# Patient Record
Sex: MALE | Race: WHITE | NOT HISPANIC OR LATINO | Employment: OTHER | URBAN - METROPOLITAN AREA
[De-identification: names, ages, dates, MRNs, and addresses within clinical notes are randomized per-mention and may not be internally consistent; named-entity substitution may affect disease eponyms.]

---

## 2019-08-30 ENCOUNTER — COMMUNICATION - HEALTHEAST (OUTPATIENT)
Dept: SCHEDULING | Facility: CLINIC | Age: 59
End: 2019-08-30

## 2019-08-30 ENCOUNTER — OFFICE VISIT - HEALTHEAST (OUTPATIENT)
Dept: FAMILY MEDICINE | Facility: CLINIC | Age: 59
End: 2019-08-30

## 2019-08-30 DIAGNOSIS — Z87.19 HISTORY OF DIVERTICULITIS: ICD-10-CM

## 2019-08-30 DIAGNOSIS — R10.32 ABDOMINAL PAIN, LEFT LOWER QUADRANT: ICD-10-CM

## 2019-08-30 RX ORDER — ATORVASTATIN CALCIUM 40 MG/1
TABLET, FILM COATED ORAL
Status: SHIPPED | COMMUNITY
Start: 2019-03-28

## 2019-08-30 RX ORDER — LOSARTAN POTASSIUM 25 MG/1
25 TABLET ORAL
Status: SHIPPED | COMMUNITY
Start: 2019-08-30

## 2019-09-04 ENCOUNTER — RECORDS - HEALTHEAST (OUTPATIENT)
Dept: ADMINISTRATIVE | Facility: OTHER | Age: 59
End: 2019-09-04

## 2019-09-20 ENCOUNTER — HOSPITAL ENCOUNTER (OUTPATIENT)
Dept: CT IMAGING | Facility: CLINIC | Age: 59
Discharge: HOME | End: 2019-09-20

## 2019-09-20 DIAGNOSIS — R10.32 LLQ PAIN: ICD-10-CM

## 2019-09-20 LAB
CREAT BLD-MCNC: 0.9 MG/DL (ref 0.7–1.3)
GFR SERPL CREATININE-BSD FRML MDRD: >60 ML/MIN/1.73M2

## 2021-05-31 ENCOUNTER — RECORDS - HEALTHEAST (OUTPATIENT)
Dept: ADMINISTRATIVE | Facility: CLINIC | Age: 61
End: 2021-05-31

## 2021-05-31 NOTE — TELEPHONE ENCOUNTER
"Pt reports he gets diverticulitis about every three months. Pt reports \"I think I am getting it again\". Tender LLQ currently. Urge to have a bowel movement. No fever. Passing gas.Pt rates pain \"4\" and constant. Pt denies any other acute symptoms.[facility] for three days due to holiday weekend per pt.     Advised pt to be seen at [facility] clinic within four hours per protocol.    Pt states he plans to go to [facility] tomorrow. Verbalizes understanding of recommendation to be seen today.       Reason for Disposition    [1] MILD-MODERATE pain AND [2] constant AND [3] present > 2 hours    Protocols used: ABDOMINAL PAIN - MALE-A-AH      "

## 2021-06-03 VITALS — WEIGHT: 186 LBS

## 2021-06-17 NOTE — PATIENT INSTRUCTIONS - HE
Patient Instructions by Denton Jon PA-C at 8/30/2019  5:30 PM     Author: Denton Jon PA-C Service: -- Author Type: Physician Assistant    Filed: 8/30/2019  6:04 PM Encounter Date: 8/30/2019 Status: Addendum    : Denton Jon PA-C (Physician Assistant)    Related Notes: Original Note by Denton Jon PA-C (Physician Assistant) filed at 8/30/2019  6:03 PM         Take antibiotic as written for your diverticulitis.  If you develop worsening pain, blood or mucus in the stools or fever or fatigue follow-up in the emergency room immediately for reevaluation and potential abdominal CT.  He should not be getting any worse over the next 2 days.  He should have good improvement after 48 hours.  Follow the instructions for discharge as outlined below.      Patient Education     Discharge Instructions for Diverticulitis  You have been diagnosed with diverticulitis. This is a condition in which small pouches form in your colon (large intestine) and become inflamed or infected. Follow the guidelines below for home care.  As you recover  Tips for recovery include:    Eat a low-fiber diet. Your healthcare provider may advise a liquid diet. This gives your bowel a chance to rest so that it can recover.    Foods to include: flake cereal, mashed potatoes, pancakes, waffles, pasta, white bread, rice, applesauce, bananas, eggs, fish, poultry, tofu, and well-cooked vegetables    Take your medicines as directed. Do not stop taking the medicines, even if you feel better.    Monitor your temperature and report any rising temperature to your healthcare provider.    Take antibiotics exactly as directed. Do not miss any and keep taking them even if you feel better.     Drink 6 to 8 glasses of water every day, unless directed otherwise.    Use a heating pad or hot water bottle to reduce abdominal cramping or pain.  Preventing diverticulitis in the future  Tips for prevention include:    Eat a high-fiber diet. Fiber adds bulk to the  stool so that it passes through the large intestine more easily.    Keep drinking 6 to 8 glasses of water every day, unless directed otherwise.    Begin an exercise program. Ask your healthcare provider how to get started. You can benefit from simple activities such as walking or gardening.    Treat diarrhea with a bland diet. Start with liquids only, then slowly add fiber over time.    Watch for changes in your bowel movements (constipation to diarrhea).    Avoid constipation with fiber and add a stool softener if needed.     Get plenty of rest and sleep.  Follow-up care  Make a follow-up appointment as directed by our staff.  When to call your healthcare provider  Call your healthcare provider immediately if you have any of the following:    Fever of 100.4 F (38.0 C) or higher, or as directed by your healthcare provider    Chills    Severe cramps in the belly, most commonly the lower left side    Tenderness in the belly, most commonly the lower left side    Nausea and vomiting    Bleeding from your rectum   Date Last Reviewed: 7/1/2016 2000-2017 [employer]. [address] [place] [address]. All rights reserved. This information is not intended as a substitute for professional medical care. Always follow your healthcare professional's instructions.           Patient Education     Diverticulitis    Some people get pouches along the wall of the colon as they get older. The pouches, called diverticuli, usually cause no symptoms. If the pouches become blocked, you can get an infection. This infection is called diverticulitis. It causes pain in your lower abdomen and fever. If not treated, it can become a serious condition, causing an abscess to form inside the pouch. The abscess may block the intestinal tract even or rupture, spreading infection throughout the abdomen.  When treatment is started early, oral antibiotics alone may be enough to cure diverticulitis. This method is tried first.  But, if you don't improve or if your condition gets worse while using oral antibiotics, you may need to be admitted to the hospital for IV antibiotics. Severe cases may require surgery.  Home care  The following guidelines will help you care for yourself at home:    During the acute illness, rest and follow your healthcare provider's instructions about diet. Sometimes you will need to follow a clear liquid diet to rest your bowel. Once your symptoms are better, you may be told to follow a low-fiber diet for some time. Include foods like:  ? Flake cereal, mashed potatoes, pancakes, waffles, pasta, white bread, rice, applesauce, bananas, eggs, fish, poultry, tofu, and cooked soft vegetables    Take antibiotics exactly as instructed. Don't miss any doses or stop taking the medication, even if you feel better.    Monitor your temperature and tell your healthcare provider if you have rising temperatures.  Preventing future attacks  Once you have an episode of diverticulitis, you are at risk for having it again. After you have recovered from this episode, you may be able to lower your risk by eating a high-fiber diet (20 gm/day to 35 gm/day of fiber). This cleans out the colon pouches that already exist and may prevent new ones from forming. Foods high in fiber include fresh fruits and edible peelings, raw or lightly cooked vegetables, whole grain cereals and breads, dried beans and peas, and bran.  Other steps that can help prevent future attacks include:    Take your medicines, such as antibiotics, as your healthcare provider says.    Drink 6 to 8 glasses of water every day, unless told otherwise.    Use a heating pad or hot water bottle to help abdominal cramping or pain.    Begin an exercise program. Ask your healthcare provider how to get started. You can benefit from simple activities such as walking or gardening.    Treat diarrhea with a bland diet. Start with liquids only; then slowly add fiber over time.    Watch  for changes in your bowel movements (constipation to diarrhea). Avoid constipation by eating a high fiber diet and taking a stool softener if needed.    Get plenty of rest and sleep.  Follow-up care  Follow up with your healthcare provider as advised or sooner if you are not getting better in the next 2 days.  When to seek medical advice  Call your healthcare provider right away if any of these occur:    Fever of 100.4 F (38 C) or higher, or as directed by your healthcare provider    Repeated vomiting or swelling of the abdomen    Weakness, dizziness, light-headedness    Pain in your abdomen that gets worse, severe, or spreads to your back    Pain that moves to the right lower abdomen    Rectal bleeding (stools that are red, black or maroon color)    Unexpected vaginal bleeding  Date Last Reviewed: 9/1/2016 2000-2017 [employer]. [address] [place] [address], [place] [address]. All rights reserved. This information is not intended as a substitute for professional medical care. Always follow your healthcare professional's instructions.

## 2021-06-27 NOTE — PROGRESS NOTES
Progress Notes by Denton Jon PA-C at 8/30/2019  5:30 PM     Author: Denton Jon PA-C Service: -- Author Type: Physician Assistant    Filed: 8/30/2019  6:14 PM Encounter Date: 8/30/2019 Status: Signed    : Denton Jon PA-C (Physician Assistant)       Subjective:      Patient ID: Ishan Scanlon is a 58 y.o. male.    Chief Complaint:    HPI     Ishan Scanlon is a 58 y.o. male with a past medical history of diverticulitis who presents today complaining of 4-day history of left lower quadrant abdominal pain.  Patient states that it started out with some constipation on the left lower quadrant he has had a small amount of discomfort.  He had used bulk forming agent and he has had a stool today.  There is no blood or mucus in the stool.  Subsequently had left lower quadrant pain that developed roughly a 4 out of 10 pain.  He does not have any difficulty with nausea and vomiting and no fever chills or night sweats.  He has increased his fluids and has rested.  He has not had any other complications to report.    No past medical history on file.    No past surgical history on file.    No family history on file.    Social History     Tobacco Use   ? Smoking status: Never Smoker   ? Smokeless tobacco: Never Used   Substance Use Topics   ? Alcohol use: Not on file   ? Drug use: Not on file       Review of Systems  As above in HPI, otherwise balance of Review of Systems are negative.    Objective:     /73 (Patient Site: Right Arm, Patient Position: Sitting, Cuff Size: Adult Regular)   Pulse 66   Temp 98.1  F (36.7  C) (Oral)   Resp 16   Wt 186 lb (84.4 kg)   SpO2 96%     Physical Exam  General: Patient is resting comfortably no acute distress is afebrile  HEENT: Head is normocephalic atraumatic   eyes are PERRL EOMI sclera anicteric   TMs are clear bilaterally  Throat is clear  No cervical lymphadenopathy present  LUNGS: Clear to auscultation bilaterally  HEART: Regular rate and rhythm  Abdomen:  Normoactive bowel sounds x4.  He does have left lower quadrant abdominal pain to palpation.  Slight rebound but no guarding.  Skin: Without rash non-diaphoretic      Assessment:     Procedures    The primary encounter diagnosis was Abdominal pain, left lower quadrant. A diagnosis of History of diverticulitis was also pertinent to this visit.    Plan:     1. Abdominal pain, left lower quadrant  ciprofloxacin HCl (CIPRO) 500 MG tablet    metroNIDAZOLE (FLAGYL) 500 MG tablet   2. History of diverticulitis  ciprofloxacin HCl (CIPRO) 500 MG tablet    metroNIDAZOLE (FLAGYL) 500 MG tablet       Multiple etiologies and diagnoses were considered.  I did state and have a long conversation with the patient stating that he has had diverticulitis in the past.  He is not having a fever and he has minimal pain.  Empiric course of antibiotics as written to include the Cipro and Flagyl.  Indication for return to include fever, blood or mucus in the stools or increasing abdominal pain he will present to the emergency room for CT of the abdomen and reevaluation of treatment.  Patient voiced understanding of that concern.    Patient Instructions       Take antibiotic as written for your diverticulitis.  If you develop worsening pain, blood or mucus in the stools or fever or fatigue follow-up in the emergency room immediately for reevaluation and potential abdominal CT.  He should not be getting any worse over the next 2 days.  He should have good improvement after 48 hours.  Follow the instructions for discharge as outlined below.      Patient Education     Discharge Instructions for Diverticulitis  You have been diagnosed with diverticulitis. This is a condition in which small pouches form in your colon (large intestine) and become inflamed or infected. Follow the guidelines below for home care.  As you recover  Tips for recovery include:    Eat a low-fiber diet. Your healthcare provider may advise a liquid diet. This gives your bowel  a chance to rest so that it can recover.    Foods to include: flake cereal, mashed potatoes, pancakes, waffles, pasta, white bread, rice, applesauce, bananas, eggs, fish, poultry, tofu, and well-cooked vegetables    Take your medicines as directed. Do not stop taking the medicines, even if you feel better.    Monitor your temperature and report any rising temperature to your healthcare provider.    Take antibiotics exactly as directed. Do not miss any and keep taking them even if you feel better.     Drink 6 to 8 glasses of water every day, unless directed otherwise.    Use a heating pad or hot water bottle to reduce abdominal cramping or pain.  Preventing diverticulitis in the future  Tips for prevention include:    Eat a high-fiber diet. Fiber adds bulk to the stool so that it passes through the large intestine more easily.    Keep drinking 6 to 8 glasses of water every day, unless directed otherwise.    Begin an exercise program. Ask your healthcare provider how to get started. You can benefit from simple activities such as walking or gardening.    Treat diarrhea with a bland diet. Start with liquids only, then slowly add fiber over time.    Watch for changes in your bowel movements (constipation to diarrhea).    Avoid constipation with fiber and add a stool softener if needed.     Get plenty of rest and sleep.  Follow-up care  Make a follow-up appointment as directed by our staff.  When to call your healthcare provider  Call your healthcare provider immediately if you have any of the following:    Fever of 100.4 F (38.0 C) or higher, or as directed by your healthcare provider    Chills    Severe cramps in the belly, most commonly the lower left side    Tenderness in the belly, most commonly the lower left side    Nausea and vomiting    Bleeding from your rectum   Date Last Reviewed: 7/1/2016 2000-2017 [employer]. [address] [place] [address], [place] [address]. All rights reserved. This information  is not intended as a substitute for professional medical care. Always follow your healthcare professional's instructions.           Patient Education     Diverticulitis    Some people get pouches along the wall of the colon as they get older. The pouches, called diverticuli, usually cause no symptoms. If the pouches become blocked, you can get an infection. This infection is called diverticulitis. It causes pain in your lower abdomen and fever. If not treated, it can become a serious condition, causing an abscess to form inside the pouch. The abscess may block the intestinal tract even or rupture, spreading infection throughout the abdomen.  When treatment is started early, oral antibiotics alone may be enough to cure diverticulitis. This method is tried first. But, if you don't improve or if your condition gets worse while using oral antibiotics, you may need to be admitted to the hospital for IV antibiotics. Severe cases may require surgery.  Home care  The following guidelines will help you care for yourself at home:    During the acute illness, rest and follow your healthcare provider's instructions about diet. Sometimes you will need to follow a clear liquid diet to rest your bowel. Once your symptoms are better, you may be told to follow a low-fiber diet for some time. Include foods like:  ? Flake cereal, mashed potatoes, pancakes, waffles, pasta, white bread, rice, applesauce, bananas, eggs, fish, poultry, tofu, and cooked soft vegetables    Take antibiotics exactly as instructed. Don't miss any doses or stop taking the medication, even if you feel better.    Monitor your temperature and tell your healthcare provider if you have rising temperatures.  Preventing future attacks  Once you have an episode of diverticulitis, you are at risk for having it again. After you have recovered from this episode, you may be able to lower your risk by eating a high-fiber diet (20 gm/day to 35 gm/day of fiber). This cleans  out the colon pouches that already exist and may prevent new ones from forming. Foods high in fiber include fresh fruits and edible peelings, raw or lightly cooked vegetables, whole grain cereals and breads, dried beans and peas, and bran.  Other steps that can help prevent future attacks include:    Take your medicines, such as antibiotics, as your healthcare provider says.    Drink 6 to 8 glasses of water every day, unless told otherwise.    Use a heating pad or hot water bottle to help abdominal cramping or pain.    Begin an exercise program. Ask your healthcare provider how to get started. You can benefit from simple activities such as walking or gardening.    Treat diarrhea with a bland diet. Start with liquids only; then slowly add fiber over time.    Watch for changes in your bowel movements (constipation to diarrhea). Avoid constipation by eating a high fiber diet and taking a stool softener if needed.    Get plenty of rest and sleep.  Follow-up care  Follow up with your healthcare provider as advised or sooner if you are not getting better in the next 2 days.  When to seek medical advice  Call your healthcare provider right away if any of these occur:    Fever of 100.4 F (38 C) or higher, or as directed by your healthcare provider    Repeated vomiting or swelling of the abdomen    Weakness, dizziness, light-headedness    Pain in your abdomen that gets worse, severe, or spreads to your back    Pain that moves to the right lower abdomen    Rectal bleeding (stools that are red, black or maroon color)    Unexpected vaginal bleeding  Date Last Reviewed: 9/1/2016 2000-2017 [employer]. [address] [place] [address], [place] [address]. All rights reserved. This information is not intended as a substitute for professional medical care. Always follow your healthcare professional's instructions.

## 2022-08-15 ENCOUNTER — HOSPITAL ENCOUNTER (EMERGENCY)
Facility: CLINIC | Age: 62
Discharge: HOME | End: 2022-08-15
Attending: FAMILY MEDICINE | Admitting: FAMILY MEDICINE
Payer: OTHER GOVERNMENT

## 2022-08-15 VITALS
WEIGHT: 182 LBS | HEART RATE: 80 BPM | TEMPERATURE: 97.1 F | SYSTOLIC BLOOD PRESSURE: 128 MMHG | OXYGEN SATURATION: 97 % | BODY MASS INDEX: 25.48 KG/M2 | RESPIRATION RATE: 18 BRPM | HEIGHT: 71 IN | DIASTOLIC BLOOD PRESSURE: 88 MMHG

## 2022-08-15 DIAGNOSIS — T23.252A PARTIAL THICKNESS BURN OF PALM OF LEFT HAND, INITIAL ENCOUNTER: ICD-10-CM

## 2022-08-15 PROCEDURE — 99283 EMERGENCY DEPT VISIT LOW MDM: CPT | Performed by: FAMILY MEDICINE

## 2022-08-15 PROCEDURE — 99284 EMERGENCY DEPT VISIT MOD MDM: CPT | Performed by: FAMILY MEDICINE

## 2022-08-15 NOTE — ED PROVIDER NOTES
"  HPI   The patient is a 61-year-old male presenting with a burn involving his left palm.  This occurred just prior to arrival.  The patient was using unearth moving machine and accidentally fell onto the exhaust pipe with his left palm.  He pulled it off quickly but there was an obvious burn.  The burn occurred hours prior to arrival.  There is some blistering.  Pain is moderate.  No open skin.  No other injuries.  He denies loss of function except for that related to pain.        Allergies:  No Known Allergies  Problem List:    There are no problems to display for this patient.     Past Medical History:    No past medical history on file.  Past Surgical History:    No past surgical history on file.  Family History:    No family history on file.  Social History:  Marital Status:   [2]  Social History     Tobacco Use     Smoking status: Never Smoker     Smokeless tobacco: Never Used      Medications:    atorvastatin (LIPITOR) 40 MG tablet  losartan (COZAAR) 25 MG tablet  TIMOLOL OPHT      Review of Systems   All other systems reviewed and are negative.      PE   BP: 128/88  Pulse: 80  Temp: 97.1  F (36.2  C)  Resp: 18  Height: 180.3 cm (5' 11\")  Weight: 82.6 kg (182 lb)  SpO2: 97 %  Physical Exam  Vitals and nursing note reviewed.   Constitutional:       General: He is not in acute distress.  HENT:      Head: Atraumatic.      Right Ear: External ear normal.      Left Ear: External ear normal.      Nose: Nose normal.      Mouth/Throat:      Mouth: Mucous membranes are moist.      Pharynx: Oropharynx is clear.   Eyes:      General: No scleral icterus.     Extraocular Movements: Extraocular movements intact.      Conjunctiva/sclera: Conjunctivae normal.      Pupils: Pupils are equal, round, and reactive to light.   Cardiovascular:      Rate and Rhythm: Normal rate.   Pulmonary:      Effort: Pulmonary effort is normal. No respiratory distress.   Musculoskeletal:         General: Normal range of motion.      " Cervical back: Normal range of motion.   Skin:     General: Skin is warm and dry.      Comments: There is some erythema along the thenar eminence, left hand.  More toward the middle of his palm is a curvilinear area of blistering.  This is rather small and area.  Overall, the burn covers approximately half of the palm.  No open skin.  No loss of function.   Neurological:      Mental Status: He is alert and oriented to person, place, and time.   Psychiatric:         Behavior: Behavior normal.         ED COURSE and [facility]   1852.  The patient has a palm burn injury.  No open skin.  Small amounts of blistered skin present.  No reason to open the blistered skin at this time.  Continue with ibuprofen and Tylenol for comfort.  Use topical antibiotic ointment when the blister opens.  Follow-up discussed.  Return for worsening.  The patient agrees with the plan and would like to be discharged home.    LABS  Labs Ordered and Resulted from Time of ED Arrival to Time of ED Departure - No data to display    IMAGING  Images reviewed by me.  Radiology report also reviewed.  No orders to display       Procedures    Medications - No data to display      IMPRESSION       ICD-10-CM    1. Partial thickness burn of palm of left hand, initial encounter  T23.252A             Medication List      There are no discharge medications for this visit.                     Wing Lara MD  08/15/22 4740

## 2022-08-15 NOTE — ED TRIAGE NOTES
Pt reports he put he burned his hand on an exhaust fan on a small motor, pt has blistering to palm of left hand.

## 2023-08-18 ENCOUNTER — HOSPITAL ENCOUNTER (EMERGENCY)
Facility: CLINIC | Age: 63
Discharge: HOME | End: 2023-08-18
Attending: PHYSICIAN ASSISTANT | Admitting: PHYSICIAN ASSISTANT
Payer: COMMERCIAL

## 2023-08-18 VITALS
SYSTOLIC BLOOD PRESSURE: 111 MMHG | RESPIRATION RATE: 16 BRPM | OXYGEN SATURATION: 97 % | TEMPERATURE: 98.3 F | HEART RATE: 67 BPM | DIASTOLIC BLOOD PRESSURE: 78 MMHG

## 2023-08-18 DIAGNOSIS — T63.481A INSECT STING, ACCIDENTAL OR UNINTENTIONAL, INITIAL ENCOUNTER: ICD-10-CM

## 2023-08-18 PROCEDURE — 99213 OFFICE O/P EST LOW 20 MIN: CPT | Performed by: PHYSICIAN ASSISTANT

## 2023-08-18 PROCEDURE — G0463 HOSPITAL OUTPT CLINIC VISIT: HCPCS | Performed by: PHYSICIAN ASSISTANT

## 2023-08-18 RX ORDER — PREDNISONE 20 MG/1
TABLET ORAL
Qty: 10 TABLET | Refills: 0 | Status: SHIPPED | OUTPATIENT
Start: 2023-08-18

## 2023-08-18 RX ORDER — LOTEPREDNOL ETABONATE 5 MG/ML
SUSPENSION/ DROPS OPHTHALMIC
COMMUNITY
Start: 2022-09-02

## 2023-08-18 RX ORDER — DORZOLAMIDE HYDROCHLORIDE AND TIMOLOL MALEATE 20; 5 MG/ML; MG/ML
1 SOLUTION/ DROPS OPHTHALMIC
COMMUNITY

## 2023-08-18 ASSESSMENT — ACTIVITIES OF DAILY LIVING (ADL): ADLS_ACUITY_SCORE: 35

## 2023-08-18 ASSESSMENT — ENCOUNTER SYMPTOMS
CARDIOVASCULAR NEGATIVE: 1
NEUROLOGICAL NEGATIVE: 1
WOUND: 1
ARTHRALGIAS: 1
RESPIRATORY NEGATIVE: 1
CONSTITUTIONAL NEGATIVE: 1

## 2023-08-18 NOTE — ED PROVIDER NOTES
History     Chief Complaint   Patient presents with    Insect Bite     Wasp sting on left knee yesterday. Knee feels swollen and calf is swelling, warm to the touch. Swelling is moving down towards the ankle.     HPI  Ishan Scanlon is a 62 year old male with a past medical history of osteoarthritis, spondylosis, hypertension, and hyperlipidemia who presents for evaluation of swelling and itchiness in the left lower extremity after being stung by an insect yesterday.  States that he was hiking in was stung on the back of the left knee by an insect yesterday.  Did not see a count of insect it was.  Over the past day he has felt more swelling in the left knee and swelling extending down into the left calf.  Has felt some warmth to touch in the left knee and left lower extremity.  Swelling is moving down toward the ankle.  Able to ambulate without difficulty.  No fevers, no chest pain or shortness of breath.  No concerns for breathing or swallowing.  No facial swelling or tongue swelling.  No history of anaphylaxis or angioedema.  [facility] he be evaluated for left lower extremity swelling and warmth, concern for cellulitis.  He has not been taking any over-the-counter medications for relief of current symptoms.    Allergies:  No Known Allergies    Problem List:    There are no problems to display for this patient.       Past Medical History:    No past medical history on file.    Past Surgical History:    No past surgical history on file.    Family History:    No family history on file.    Social History:  Marital Status:   [2]  Social History     Tobacco Use    Smoking status: Never    Smokeless tobacco: Never        Medications:    atorvastatin (LIPITOR) 40 MG tablet  dorzolamide-timolol (COSOPT) 2-0.5 % ophthalmic solution  losartan (COZAAR) 25 MG tablet  loteprednol (LOTEMAX) 0.5 % ophthalmic suspension  predniSONE (DELTASONE) 20 MG tablet  TIMOLOL  OPHT          Review of Systems   Constitutional: Negative.    Respiratory: Negative.     Cardiovascular: Negative.    Musculoskeletal:  Positive for arthralgias.   Skin:  Positive for wound.   Neurological: Negative.        Physical Exam   BP: 111/78  Pulse: 67  Temp: 98.3  F (36.8  C)  Resp: 16  SpO2: 97 %      Physical Exam  Vitals reviewed.   Constitutional:       General: He is not in acute distress.     Appearance: Normal appearance. He is not ill-appearing, toxic-appearing or diaphoretic.   HENT:      Head:      Comments: Airways patent, no facial swelling.     Mouth/Throat:      Mouth: Mucous membranes are moist.      Pharynx: Oropharynx is clear. No oropharyngeal exudate or posterior oropharyngeal erythema.   Cardiovascular:      Pulses: Normal pulses.           Dorsalis pedis pulses are 2+ on the right side and 2+ on the left side.   Pulmonary:      Effort: Pulmonary effort is normal. No respiratory distress.      Breath sounds: Normal breath sounds. No stridor. No wheezing, rhonchi or rales.   Chest:      Chest wall: No tenderness.   Musculoskeletal:         General: No swelling or tenderness.      Cervical back: No muscular tenderness.      Left knee: Normal. No swelling, effusion or erythema. Normal range of motion. No tenderness.      Instability Tests: Anterior drawer test negative. Posterior drawer test negative. Anterior Lachman test negative. Medial Nick test negative and lateral Nick test negative.      Right lower leg: No swelling. No edema.      Left lower leg: Swelling present. No edema.      Right ankle: Normal.      Left ankle: Normal.      Comments: Mild swelling in the left lower extremity, about 2 to 3 cm more in diameter than the right lower extremity.  Strength is 5/5 in the left knee and left lower extremity.  Normal sensation to light touch.   Skin:     General: Skin is warm and dry.      Findings: No erythema, laceration or rash.   Neurological:      Mental Status: He is alert  and oriented to person, place, and time.      Sensory: No sensory deficit.         ED Course                 Procedures       No results found for this or any previous visit (from the past 24 hour(s)).    Medications - No data to display    Assessments & Plan (with Medical Decision Making)     Presentation and physical exam are consistent with an insect sting to the left lower extremity, which occurred yesterday.  Has localized swelling in the left lower extremity.  Swelling is most likely due to the insect sting.  However, I discussed the possibility of a DVT given there is noted swelling in the left lower extremity.  Offered to schedule an outpatient ultrasound but patient declined, would [facility].  [facility] if swelling persist beyond 5 days.    Patient is in no apparent distress, vital signs are within normal limits, no chest pain or shortness of breath, no signs of angioedema or anaphylaxis today.  No findings consistent with cellulitis today.  Cellulitis typically does not develop over 24 hours, typically requires more time.  Discussed this with the patient.    Starting prednisone today due to concern for localized allergic reaction.  Do not take ibuprofen when taking prednisone.  Apply Ace wrap to the left lower extremity and elevate the left lower extremity is much as possible for symptomatic relief of swelling.    May take over-the-counter Zyrtec for symptomatic relief of itching.  May also take Benadryl at night.  Side effects of Benadryl discussed.    Return to clinic for further evaluation if you develop fever of 101 degrees F or greater, chest pain, difficulty breathing, palpitations, shortness of breath, difficulty swallowing, severe headache, worsening tenderness/swelling/redness in the left lower extremity, dizziness or lightheadedness, acute vision changes, acutely worsening rash, severe abdominal pain, or  uncontrolled nausea/vomiting.        I have reviewed the nursing notes.    I have reviewed the findings, diagnosis, plan and need for follow up with the patient.      New Prescriptions    PREDNISONE (DELTASONE) 20 MG TABLET    Take two tablets (= 40mg) each day for 5 (five) days       Final diagnoses:   Insect sting, accidental or unintentional, initial encounter       8/18/2023   [facility] EMERGENCY DEPT       Denton Brandt PA-C  08/18/23 [id number]

## 2023-08-18 NOTE — DISCHARGE INSTRUCTIONS
Apply Ace wrap to the left lower extremity and elevate the left lower extremity is much as possible for symptomatic relief of swelling.    Follow-up in clinic if swelling persists over 5 days.    May take over-the-counter Zyrtec for symptomatic relief of itching.  May also take Benadryl at night.  Side effects of Benadryl discussed.    Return to clinic for further evaluation if you develop fever of 101 degrees F or greater, chest pain, difficulty breathing, palpitations, shortness of breath, difficulty swallowing, severe headache, worsening tenderness/swelling/redness in the left lower extremity, dizziness or lightheadedness, acute vision changes, acutely worsening rash, severe abdominal pain, or uncontrolled nausea/vomiting.

## 2023-10-30 ENCOUNTER — LAB (OUTPATIENT)
Dept: LAB | Facility: CLINIC | Age: 63
End: 2023-10-30
Payer: MEDICARE

## 2023-10-30 DIAGNOSIS — L50.8 CHRONIC URTICARIA: ICD-10-CM

## 2023-10-30 DIAGNOSIS — I10 ESSENTIAL HYPERTENSION, MALIGNANT: ICD-10-CM

## 2023-10-30 DIAGNOSIS — I10 ESSENTIAL HYPERTENSION, MALIGNANT: Primary | ICD-10-CM

## 2023-10-30 LAB
ALBUMIN SERPL BCG-MCNC: 4.3 G/DL (ref 3.5–5.2)
ALP SERPL-CCNC: 80 U/L (ref 40–129)
ALT SERPL W P-5'-P-CCNC: 31 U/L (ref 0–70)
ANION GAP SERPL CALCULATED.3IONS-SCNC: 10 MMOL/L (ref 7–15)
AST SERPL W P-5'-P-CCNC: 25 U/L (ref 0–45)
BILIRUB SERPL-MCNC: 0.8 MG/DL
BUN SERPL-MCNC: 24.9 MG/DL (ref 8–23)
CALCIUM SERPL-MCNC: 10.2 MG/DL (ref 8.8–10.2)
CHLORIDE SERPL-SCNC: 102 MMOL/L (ref 98–107)
CREAT SERPL-MCNC: 0.94 MG/DL (ref 0.67–1.17)
CRP SERPL-MCNC: <3 MG/L
DEPRECATED HCO3 PLAS-SCNC: 27 MMOL/L (ref 22–29)
EGFRCR SERPLBLD CKD-EPI 2021: 90 ML/MIN/1.73M2
ERYTHROCYTE [DISTWIDTH] IN BLOOD BY AUTOMATED COUNT: 13.4 % (ref 10–15)
GLUCOSE SERPL-MCNC: 108 MG/DL (ref 70–99)
HCT VFR BLD AUTO: 42.7 % (ref 40–53)
HGB BLD-MCNC: 13.7 G/DL (ref 13.3–17.7)
MCH RBC QN AUTO: 27 PG (ref 26.5–33)
MCHC RBC AUTO-ENTMCNC: 32.1 G/DL (ref 31.5–36.5)
MCV RBC AUTO: 84 FL (ref 78–100)
PLATELET # BLD AUTO: 176 10E3/UL (ref 150–450)
POTASSIUM SERPL-SCNC: 3.7 MMOL/L (ref 3.4–5.3)
PROT SERPL-MCNC: 6.8 G/DL (ref 6.4–8.3)
RBC # BLD AUTO: 5.08 10E6/UL (ref 4.4–5.9)
SODIUM SERPL-SCNC: 139 MMOL/L (ref 135–145)
WBC # BLD AUTO: 9.9 10E3/UL (ref 4–11)

## 2023-10-30 PROCEDURE — 36415 COLL VENOUS BLD VENIPUNCTURE: CPT

## 2023-10-30 PROCEDURE — 86140 C-REACTIVE PROTEIN: CPT

## 2023-10-30 PROCEDURE — 85027 COMPLETE CBC AUTOMATED: CPT

## 2023-10-30 PROCEDURE — 80053 COMPREHEN METABOLIC PANEL: CPT

## 2025-04-17 ENCOUNTER — APPOINTMENT (OUTPATIENT)
Dept: GENERAL RADIOLOGY | Facility: CLINIC | Age: 65
End: 2025-04-17
Payer: COMMERCIAL

## 2025-04-17 ENCOUNTER — HOSPITAL ENCOUNTER (EMERGENCY)
Facility: CLINIC | Age: 65
Discharge: HOME | End: 2025-04-17
Payer: COMMERCIAL

## 2025-04-17 VITALS
TEMPERATURE: 98.6 F | OXYGEN SATURATION: 98 % | SYSTOLIC BLOOD PRESSURE: 133 MMHG | DIASTOLIC BLOOD PRESSURE: 87 MMHG | HEART RATE: 53 BPM | RESPIRATION RATE: 18 BRPM

## 2025-04-17 DIAGNOSIS — S92.511A CLOSED DISPLACED FRACTURE OF PROXIMAL PHALANX OF LESSER TOE OF RIGHT FOOT, INITIAL ENCOUNTER: ICD-10-CM

## 2025-04-17 PROCEDURE — 28510 TREATMENT OF TOE FRACTURE: CPT | Mod: T9

## 2025-04-17 PROCEDURE — 73660 X-RAY EXAM OF TOE(S): CPT | Mod: RT

## 2025-04-17 PROCEDURE — G0463 HOSPITAL OUTPT CLINIC VISIT: HCPCS | Mod: 25

## 2025-04-17 ASSESSMENT — COLUMBIA-SUICIDE SEVERITY RATING SCALE - C-SSRS
1. IN THE PAST MONTH, HAVE YOU WISHED YOU WERE DEAD OR WISHED YOU COULD GO TO SLEEP AND NOT WAKE UP?: NO
2. HAVE YOU ACTUALLY HAD ANY THOUGHTS OF KILLING YOURSELF IN THE PAST MONTH?: NO
6. HAVE YOU EVER DONE ANYTHING, STARTED TO DO ANYTHING, OR PREPARED TO DO ANYTHING TO END YOUR LIFE?: NO

## 2025-04-17 ASSESSMENT — ACTIVITIES OF DAILY LIVING (ADL): ADLS_ACUITY_SCORE: 41

## 2025-04-17 NOTE — DISCHARGE INSTRUCTIONS
Wear postop shoe when weightbearing and sarah tape your small toe for the next 3 to 4 weeks or until pain has resolved.    Continue to ice and elevate while at rest.    No physical exercise until pain is resolved typically 3 to 4 weeks.

## 2025-04-17 NOTE — ED PROVIDER NOTES
History     Chief Complaint   Patient presents with    Toe Injury     RT baby toe     HPI  Ishan Scanlon is a 64 year old male who presents urgent care with chief complaint of small toe injury.  Patient reports he was in his house in the dark approximately 3 weeks ago when he jammed his right small toe on the door.  Since that time he has had significant pain.  He has continued to exercise including HIT classes however he has significant pain with weightbearing and wearing shoes.  Patient has tried ice, ibuprofen without relief.      Allergies:  No Known Allergies    Problem List:    There are no active problems to display for this patient.       Past Medical History:    No past medical history on file.    Past Surgical History:    No past surgical history on file.    Family History:    No family history on file.    Social History:  Marital Status:   [2]  Social History     Tobacco Use    Smoking status: Never    Smokeless tobacco: Never        Medications:    atorvastatin (LIPITOR) 40 MG tablet  dorzolamide-timolol (COSOPT) 2-0.5 % ophthalmic solution  losartan (COZAAR) 25 MG tablet  loteprednol (LOTEMAX) 0.5 % ophthalmic suspension  predniSONE (DELTASONE) 20 MG tablet  TIMOLOL OPHT          Review of Systems   All other systems reviewed and are negative.      Physical Exam   BP: 133/87  Pulse: 53  Temp: 98.6  F (37  C)  Resp: 18  SpO2: 98 %      Physical Exam  Vitals and nursing note reviewed.   Constitutional:       General: He is not in acute distress.     Appearance: Normal appearance. He is not ill-appearing.   Cardiovascular:      Rate and Rhythm: Normal rate.      Pulses: Normal pulses.   Pulmonary:      Effort: Pulmonary effort is normal.   Musculoskeletal:      Comments: Significant tenderness to palpation at base of right small toe   Skin:     Comments: Swelling and mild erythema to right small toe   Neurological:      Mental Status: He is alert.      Sensory: No sensory deficit.   Psychiatric:          Mood and Affect: Mood normal.         ED Course        Procedures           Results for orders placed or performed during the hospital encounter of 04/17/25 (from the past 24 hours)   XR Toe Right G/E 2 Views    Narrative    EXAM: XR TOE RIGHT G/E 2 VIEWS  LOCATION: [facility]  DATE: 4/17/2025    INDICATION: pain after jamming toe for 3 weeks pain at base of small toe and proximal 5th metatarsal  COMPARISON: None.      Impression    IMPRESSION: Mildly displaced fracture of the fifth proximal phalanx neck. There is mild dorsal displacement of the distal fracture fragment. No definite intra-articular extension. Joint spaces are preserved.       Medications - No data to display    Assessments & Plan (with Medical Decision Making)     I have reviewed the nursing notes.    I have reviewed the findings, diagnosis, plan and need for follow up with the patient.        Medical Decision Making    64 year old male who presents urgent care with chief complaint of small toe injury.  Patient reports he was in his house in the dark approximately 3 weeks ago when he jammed his right small toe on the door.  Since that time he has had significant pain.  He has continued to exercise including HIT classes however he has significant pain with weightbearing and wearing shoes.  Patient has tried ice, ibuprofen without relief.      Exam above.  There is swelling and tenderness to palpation at base of right small toe.    X-ray obtained personally interpreted revealing:Mildly displaced fracture of the fifth proximal phalanx neck. There is mild dorsal displacement of the distal fracture fragment. No definite intra-articular extension. Joint spaces are preserved.     I educated patient on x-ray results and we reviewed distal phalanx fracture.  Plan treatment with sarah taping and postop shoe for the next 3 to 4 weeks.       Plan:Wear postop shoe when weightbearing and sarah tape your small toe for the next 3  to 4 weeks or until pain has resolved. Continue to ice and elevate while at rest. No physical exercise until pain is resolved typically 3 to 4 weeks.    Prior to making a final disposition on this patient the results of patient's tests and other diagnostic studies were discussed with the patient. All questions were answered. Patient expressed understanding of the plan and was amenable to it.     Disclaimer: This note consists of symbols derived from keyboarding, dictation and/or voice recognition software. As a result, there may be errors in the script that have gone undetected. Please consider this when interpreting information found in this chart.      Discharge Medication List as of 4/17/2025  1:19 PM          Final diagnoses:   Closed displaced fracture of proximal phalanx of lesser toe of right foot, initial encounter       4/17/2025   [facility] EMERGENCY DEPT       Snow Rodriguez PA-C  04/17/25 [id number]